# Patient Record
Sex: FEMALE | Race: WHITE | Employment: FULL TIME | URBAN - METROPOLITAN AREA
[De-identification: names, ages, dates, MRNs, and addresses within clinical notes are randomized per-mention and may not be internally consistent; named-entity substitution may affect disease eponyms.]

---

## 2019-06-12 ENCOUNTER — HOSPITAL ENCOUNTER (OUTPATIENT)
Dept: MAMMOGRAPHY | Age: 29
Discharge: HOME | End: 2019-06-12
Attending: NURSE PRACTITIONER
Payer: COMMERCIAL

## 2019-06-12 DIAGNOSIS — N63.13 BREAST LUMP ON RIGHT SIDE AT 7 O'CLOCK POSITION: ICD-10-CM

## 2019-06-12 DIAGNOSIS — N63.0 BREAST LUMP: ICD-10-CM

## 2019-06-12 PROCEDURE — 76642 ULTRASOUND BREAST LIMITED: CPT

## 2019-07-10 ENCOUNTER — HOSPITAL ENCOUNTER (OUTPATIENT)
Dept: MAMMOGRAPHY | Age: 29
Discharge: HOME | End: 2019-07-10
Attending: NURSE PRACTITIONER

## 2019-07-10 DIAGNOSIS — N63.20 BREAST MASS, LEFT: ICD-10-CM

## 2025-04-08 ENCOUNTER — HOSPITAL ENCOUNTER (OUTPATIENT)
Dept: PHYSICAL THERAPY | Age: 35
Setting detail: RECURRING SERIES
Discharge: HOME | End: 2025-04-11
Payer: COMMERCIAL

## 2025-04-08 DIAGNOSIS — M54.59 OTHER LOW BACK PAIN: Primary | ICD-10-CM

## 2025-04-08 DIAGNOSIS — M62.81 MUSCLE WEAKNESS (GENERALIZED): ICD-10-CM

## 2025-04-08 DIAGNOSIS — M25.552 PAIN IN LEFT HIP: ICD-10-CM

## 2025-04-08 PROCEDURE — 97162 PT EVAL MOD COMPLEX 30 MIN: CPT

## 2025-04-08 PROCEDURE — 97110 THERAPEUTIC EXERCISES: CPT

## 2025-04-08 NOTE — THERAPY EVALUATION
full lumbar AROM without pain or compensation to attain picking up objects from the ground     Discharge Goals: Time Frame: 8 weeks   Pt will report at least 90% improvement in pain and function to return to PLOF and pain free status    Pt will demonstrate proper hip hinging with addition of weight to mimic functional bending and lifting at home   Pt will report sleeping without pain at night   Pt will demonstrate x15 sit <-> stand without symptoms or compensation to improve LE strength and endurance          Medical Necessity:   > Skilled intervention continues to be required due to low back pain limiting sleeping and sitting .  Reason For Services/Other Comments:  > Patient continues to require skilled intervention due to low back pain limiting sleeping and sitting.      Regarding Ning Kennedy's therapy, I certify that the treatment plan above will be carried out by a therapist or under their direction.  Thank you for this referral,  EDWIGE DYER, PT     Referring Physician Signature: George Bustamante MD _______________________________ Date _____________        Charge Capture  Events  Appt Desk  Attendance Report

## 2025-04-09 ASSESSMENT — PAIN SCALES - GENERAL: PAINLEVEL_OUTOF10: 6

## 2025-04-09 NOTE — PROGRESS NOTES
Ning Kennedy  : 1990  Primary: Veena (Commercial)  Secondary:  [facility] [place] @ [place] [address] [place] [address]  Phone: [phone]  Fax: [phone] Plan Frequency: 2x for 8 weeks    Plan of Care/Certification Expiration Date: 25      >PT Visit Info:  Plan Frequency: 2x for 8 weeks  Plan of Care/Certification Expiration Date: 25  Progress Note Counter: 1      Visit Count:  1    OUTPATIENT PHYSICAL THERAPY:OP NOTE TYPE: Treatment Note 2025       Episode  }Appt Desk             Treatment Diagnosis:    Other low back pain  Pain in left hip  Muscle weakness (generalized)  Medical/Referring Diagnosis:  Acute bilateral low back pain without sciatica [M54.50]  Referring Physician:  George Bustamante MD MD Orders:  PT Eval and Treat   Date of Onset:  Onset Date: 25     Allergies:   Bupropion  Restrictions/Precautions:  No data recordedNo data recorded   Interventions Planned (Treatment may consist of any combination of the following):    No data recorded   >Subjective Comments:   Chief complaint of low back pain, see eval   >Initial:     6/10>Post Session:       5/10  Medications Last Reviewed:  2025  Updated Objective Findings:  See Evaluation Note from today  Treatment   THERAPEUTIC EXERCISE: (15 minutes):    Exercises per grid below to improve mobility, strength, and balance.  Required minimal verbal and manual cues to promote proper body alignment, promote proper body posture, and promote proper body mechanics.  Progressed resistance, range, and repetitions as indicated.  Date: 25  Supine Hip windshield wipers   Ppt x10 reps - struggles   Figure 4 seated   L hip and SI LAD mjm grade 4/5 - verbal consent   L glute stm/mfr and TLJ pa     Deferred        Treatment/Session Summary:    >Treatment Assessment:   Manju demonstrated good understanding of POC and HEP. Plan to progress hip and core mobility and stability.

## 2025-04-11 ENCOUNTER — APPOINTMENT (OUTPATIENT)
Dept: PHYSICAL THERAPY | Age: 35
End: 2025-04-11
Payer: COMMERCIAL

## 2025-04-15 ENCOUNTER — HOSPITAL ENCOUNTER (OUTPATIENT)
Dept: PHYSICAL THERAPY | Age: 35
Setting detail: RECURRING SERIES
Discharge: HOME | End: 2025-04-18
Payer: COMMERCIAL

## 2025-04-15 PROCEDURE — 97140 MANUAL THERAPY 1/> REGIONS: CPT

## 2025-04-15 PROCEDURE — 97110 THERAPEUTIC EXERCISES: CPT

## 2025-04-15 NOTE — PROGRESS NOTES
Ning Kennedy  : 1990  Primary: Veena Open Access Plus (oap) (Commercial)  Secondary:  [facility] @ [place] [address] [place] [address]  Phone: [phone]  Fax: [phone] Plan Frequency: 2x for 8 weeks    Plan of Care/Certification Expiration Date: 25      >PT Visit Info:  Plan Frequency: 2x for 8 weeks  Plan of Care/Certification Expiration Date: 25  Progress Note Counter: 2      Visit Count:  2    OUTPATIENT PHYSICAL THERAPY:OP NOTE TYPE: Treatment Note 4/15/2025       Episode  }Appt Desk             Treatment Diagnosis:    Other low back pain  Pain in left hip  Muscle weakness (generalized)  Medical/Referring Diagnosis:  Acute bilateral low back pain without sciatica [M54.50]  Referring Physician:  George Bustamante MD MD Orders:  PT Eval and Treat   Date of Onset:  Onset Date: 25     Allergies:   Bupropion  Restrictions/Precautions:  No data recordedNo data recorded   Interventions Planned (Treatment may consist of any combination of the following):    No data recorded   >Subjective Comments:  Manju states she is doing ok, sleeping is improved with pillow under knees. Practicing HEP.   >Initial:     4/10>Post Session:       2/10  Medications Last Reviewed:  4/15/2025  Updated Objective Findings:    L hip PROM: 100, 40, 8     Post tx: flexion 110, ER 50, IR 15   Treatment   MANUAL THERAPY: (15 minutes):   Joint mobilization and Soft tissue mobilization was utilized and necessary because of the patient's restricted joint motion and painful spasm.   Date: 4/15/25  L hip post and inf glide mjm grade 3/4   L glute stm/mfr   L3/4 L4/5 gapping mjm grade 3/4   Deferred          THERAPEUTIC EXERCISE: (40 minutes):    Exercises per grid below to improve mobility, strength, and balance.  Required minimal verbal and manual cues to promote proper body alignment, promote proper body posture, and promote proper body mechanics.  Progressed resistance,

## 2025-04-16 ASSESSMENT — PAIN SCALES - GENERAL: PAINLEVEL_OUTOF10: 4

## 2025-04-17 NOTE — PROGRESS NOTES
Ningedith Kennedy  : 1990  Primary: Veena Open Access Plus (oap) (Commercial)  Secondary:  [facility] @ [place] [address] [place] [address]  Phone: [phone]  Fax: [phone] Plan Frequency: 2x for 8 weeks    Plan of Care/Certification Expiration Date: 25      >PT Visit Info:  Plan Frequency: 2x for 8 weeks  Plan of Care/Certification Expiration Date: 25  Progress Note Counter: 3      Visit Count:  3    OUTPATIENT PHYSICAL THERAPY:OP NOTE TYPE: Treatment Note 2025       Episode  }Appt Desk             Treatment Diagnosis:    Other low back pain  Pain in left hip  Muscle weakness (generalized)  Medical/Referring Diagnosis:  Acute bilateral low back pain without sciatica [M54.50]  Referring Physician:  George Bustamante MD MD Orders:  PT Eval and Treat   Date of Onset:  Onset Date: 25     Allergies:   Bupropion  Restrictions/Precautions:  No data recordedNo data recorded   Interventions Planned (Treatment may consist of any combination of the following):    No data recorded   >Subjective Comments:  Manju reports that her pain has already improved significantly with 3 therapy visits.  She reports some continued discomfort in her low back though.    >Initial:     1/10>Post Session:       0/10  Medications Last Reviewed:  2025  Updated Objective Findings:    None today   Treatment   MANUAL THERAPY: (15 minutes):   Joint mobilization and Soft tissue mobilization was utilized and necessary because of the patient's restricted joint motion and painful spasm.   Date: 4/15/25  L hip post and inf glide mjm grade 3/4   L glute stm/mfr   L3/4 L4/5 gapping mjm grade 3/4   STM to lumbar erector spinae   Deferred          THERAPEUTIC EXERCISE: (40 minutes):    Exercises per grid below to improve mobility, strength, and balance.  Required minimal verbal and manual cues to promote proper body alignment, promote proper body posture, and promote proper body

## 2025-04-18 ENCOUNTER — HOSPITAL ENCOUNTER (OUTPATIENT)
Dept: PHYSICAL THERAPY | Age: 35
Setting detail: RECURRING SERIES
Discharge: HOME | End: 2025-04-21
Payer: COMMERCIAL

## 2025-04-18 PROCEDURE — 97140 MANUAL THERAPY 1/> REGIONS: CPT

## 2025-04-18 PROCEDURE — 97110 THERAPEUTIC EXERCISES: CPT

## 2025-04-18 ASSESSMENT — PAIN SCALES - GENERAL: PAINLEVEL_OUTOF10: 1

## 2025-04-22 ENCOUNTER — HOSPITAL ENCOUNTER (OUTPATIENT)
Dept: PHYSICAL THERAPY | Age: 35
Setting detail: RECURRING SERIES
Discharge: HOME | End: 2025-04-25
Payer: COMMERCIAL

## 2025-04-22 PROCEDURE — 97110 THERAPEUTIC EXERCISES: CPT

## 2025-04-22 PROCEDURE — 97140 MANUAL THERAPY 1/> REGIONS: CPT

## 2025-04-22 ASSESSMENT — PAIN SCALES - GENERAL: PAINLEVEL_OUTOF10: 1

## 2025-04-22 NOTE — PROGRESS NOTES
Ning Kennedy  : 1990  Primary: Veena Open Access Plus (oap) (Commercial)  Secondary:  [facility] @ [place] [address] [place] [address]  Phone: [phone]  Fax: [phone] Plan Frequency: 2x for 8 weeks    Plan of Care/Certification Expiration Date: 25      >PT Visit Info:  Plan Frequency: 2x for 8 weeks  Plan of Care/Certification Expiration Date: 25  Progress Note Counter: 4      Visit Count:  4    OUTPATIENT PHYSICAL THERAPY:OP NOTE TYPE: Treatment Note 2025       Episode  }Appt Desk             Treatment Diagnosis:    Other low back pain  Pain in left hip  Muscle weakness (generalized)  Medical/Referring Diagnosis:  Acute bilateral low back pain without sciatica [M54.50]  Referring Physician:  George Bustamante MD MD Orders:  PT Eval and Treat   Date of Onset:  Onset Date: 25     Allergies:   Bupropion  Restrictions/Precautions:  No data recordedNo data recorded   Interventions Planned (Treatment may consist of any combination of the following):    No data recorded   >Subjective Comments:  Manju states she is continuing to feel really good, minimal numbness and tingling in the leg.     >Initial:     1/10>Post Session:       0/10  Medications Last Reviewed:  2025  Updated Objective Findings:    None today   Treatment   MANUAL THERAPY: (15 minutes):   Joint mobilization and Soft tissue mobilization was utilized and necessary because of the patient's restricted joint motion and painful spasm.   Date: 25  L hip post and inf glide mjm grade 3/4   L glute stm/mfr   STM to lumbar erector spinae   Deferred    L3/4 L4/5 gapping mjm grade 3/4       THERAPEUTIC EXERCISE: (40 minutes):    Exercises per grid below to improve mobility, strength, and balance.  Required minimal verbal and manual cues to promote proper body alignment, promote proper body posture, and promote proper body mechanics.  Progressed resistance, range, and

## 2025-04-25 ENCOUNTER — APPOINTMENT (OUTPATIENT)
Dept: PHYSICAL THERAPY | Age: 35
End: 2025-04-25
Payer: COMMERCIAL

## 2025-05-08 ENCOUNTER — HOSPITAL ENCOUNTER (OUTPATIENT)
Dept: PHYSICAL THERAPY | Age: 35
Setting detail: RECURRING SERIES
End: 2025-05-08
Payer: COMMERCIAL

## 2025-05-12 ENCOUNTER — HOSPITAL ENCOUNTER (OUTPATIENT)
Dept: PHYSICAL THERAPY | Age: 35
Setting detail: RECURRING SERIES
End: 2025-05-12
Payer: COMMERCIAL

## 2025-05-19 ENCOUNTER — HOSPITAL ENCOUNTER (OUTPATIENT)
Dept: PHYSICAL THERAPY | Age: 35
Setting detail: RECURRING SERIES
Discharge: HOME | End: 2025-05-22
Payer: COMMERCIAL

## 2025-05-19 PROCEDURE — 97140 MANUAL THERAPY 1/> REGIONS: CPT

## 2025-05-19 PROCEDURE — 97110 THERAPEUTIC EXERCISES: CPT

## 2025-05-19 ASSESSMENT — PAIN SCALES - GENERAL: PAINLEVEL_OUTOF10: 4

## 2025-05-19 NOTE — PROGRESS NOTES
Ningedith Kennedy  : 1990  Primary: Veena Open Access Plus (oap) (Commercial)  Secondary:  [facility] @ [place] [address] [place] [address]  Phone: [phone]  Fax: [phone] Plan Frequency: 2x for 8 weeks    Plan of Care/Certification Expiration Date: 25      >PT Visit Info:  Plan Frequency: 2x for 8 weeks  Plan of Care/Certification Expiration Date: 25  Progress Note Counter: 4      Visit Count:  5    OUTPATIENT PHYSICAL THERAPY:OP NOTE TYPE: Treatment Note 2025       Episode  }Appt Desk             Treatment Diagnosis:    Other low back pain  Pain in left hip  Muscle weakness (generalized)  Medical/Referring Diagnosis:  Acute bilateral low back pain without sciatica [M54.50]  Referring Physician:  George Bustamante MD MD Orders:  PT Eval and Treat   Date of Onset:  Onset Date: 25     Allergies:   Bupropion  Restrictions/Precautions:  No data recordedNo data recorded   Interventions Planned (Treatment may consist of any combination of the following):    No data recorded   >Subjective Comments:  Manju states that she was leaning forward to  a breakfast plate and felt a \"slip\" in her back.  Was able to do some exercises on the floor but then had a lot of trouble getting up.  For the next 4 days she really struggled with mobility.  Is feeling better today but it is still very tender.    >Initial:     4/10>Post Session:       4/10  Medications Last Reviewed:  2025  Updated Objective Findings:    None today   Treatment   MANUAL THERAPY: (15 minutes):   Joint mobilization and Soft tissue mobilization was utilized and necessary because of the patient's restricted joint motion and painful spasm.   Date: 25  L hip post and inf glide mjm grade 3/4   L glute stm/mfr   STM to lumbar erector spinae   Long axis distraction R LE only  Deferred    L3/4 L4/5 gapping mjm grade 3/4       THERAPEUTIC EXERCISE: (40 minutes):    Exercises per

## 2025-05-29 ENCOUNTER — HOSPITAL ENCOUNTER (OUTPATIENT)
Dept: PHYSICAL THERAPY | Age: 35
Setting detail: RECURRING SERIES
End: 2025-05-29
Payer: COMMERCIAL

## 2025-06-05 ENCOUNTER — HOSPITAL ENCOUNTER (OUTPATIENT)
Dept: PHYSICAL THERAPY | Age: 35
Setting detail: RECURRING SERIES
Discharge: HOME | End: 2025-06-08
Payer: COMMERCIAL

## 2025-06-05 PROCEDURE — 97110 THERAPEUTIC EXERCISES: CPT

## 2025-06-05 PROCEDURE — 97140 MANUAL THERAPY 1/> REGIONS: CPT

## 2025-06-05 ASSESSMENT — PAIN SCALES - GENERAL: PAINLEVEL_OUTOF10: 3

## 2025-06-05 NOTE — PROGRESS NOTES
Ning Kennedy  : 1990  Primary: Veena Open Access Plus (oap) (Commercial)  Secondary:  [facility] @ [place] [address] [place] [address]  Phone: [phone]  Fax: [phone] Plan Frequency: 2x for 8 weeks    Plan of Care/Certification Expiration Date: 25      >PT Visit Info:  Plan Frequency: 2x for 8 weeks  Plan of Care/Certification Expiration Date: 25  Progress Note Counter: 4      Visit Count:  6    OUTPATIENT PHYSICAL THERAPY:OP NOTE TYPE: Treatment Note 2025       Episode  }Appt Desk             Treatment Diagnosis:    Other low back pain  Pain in left hip  Muscle weakness (generalized)  Medical/Referring Diagnosis:  Acute bilateral low back pain without sciatica [M54.50]  Referring Physician:  George Bustamante MD MD Orders:  PT Eval and Treat   Date of Onset:  Onset Date: 25     Allergies:   Bupropion  Restrictions/Precautions:  No data recordedNo data recorded   Interventions Planned (Treatment may consist of any combination of the following):    No data recorded   >Subjective Comments:  Manju states that her pain has gotten back to normal but she is avoiding all bending.    >Initial:     3/10>Post Session:       2/10  Medications Last Reviewed:  2025  Updated Objective Findings:    None today   Treatment   MANUAL THERAPY: (15 minutes):   Joint mobilization and Soft tissue mobilization was utilized and necessary because of the patient's restricted joint motion and painful spasm.   Date: 25  L hip post and inf glide mjm grade 3/4   L glute stm/mfr   STM to lumbar erector spinae   Long axis distraction R LE only  Deferred    L3/4 L4/5 gapping mjm grade 3/4       THERAPEUTIC EXERCISE: (40 minutes):    Exercises per grid below to improve mobility, strength, and balance.  Required minimal verbal and manual cues to promote proper body alignment, promote proper body posture, and promote proper body mechanics.  Progressed

## 2025-06-16 ENCOUNTER — HOSPITAL ENCOUNTER (OUTPATIENT)
Dept: PHYSICAL THERAPY | Age: 35
Setting detail: RECURRING SERIES
End: 2025-06-16
Payer: COMMERCIAL

## 2025-06-19 ENCOUNTER — HOSPITAL ENCOUNTER (OUTPATIENT)
Dept: PHYSICAL THERAPY | Age: 35
Setting detail: RECURRING SERIES
Discharge: HOME | End: 2025-06-22
Payer: COMMERCIAL

## 2025-06-19 PROCEDURE — 97140 MANUAL THERAPY 1/> REGIONS: CPT

## 2025-06-19 PROCEDURE — 97110 THERAPEUTIC EXERCISES: CPT

## 2025-06-19 ASSESSMENT — PAIN SCALES - GENERAL: PAINLEVEL_OUTOF10: 2

## 2025-06-19 NOTE — PROGRESS NOTES
Ning Kennedy  : 1990  Primary: Veena Open Access Plus (oap) (Commercial)  Secondary:  [facility] [place] @ [place] [address] [place] [place] [address]  Phone: [phone]  Fax: [phone] Plan Frequency: 2x for 8 weeks    Plan of Care/Certification Expiration Date: 25      >PT Visit Info:  Plan Frequency: 2x for 8 weeks  Plan of Care/Certification Expiration Date: 25  Progress Note Counter: 7      Visit Count:  7    OUTPATIENT PHYSICAL THERAPY:OP NOTE TYPE: Treatment Note 2025       Episode  }Appt Desk             Treatment Diagnosis:    Other low back pain  Pain in left hip  Muscle weakness (generalized)  Medical/Referring Diagnosis:  Acute bilateral low back pain without sciatica [M54.50]  Referring Physician:  George Bustamante MD MD Orders:  PT Eval and Treat   Date of Onset:  Onset Date: 25     Allergies:   Bupropion  Restrictions/Precautions:  No data recordedNo data recorded   Interventions Planned (Treatment may consist of any combination of the following):    No data recorded   >Subjective Comments:  Manju states that she is starting to try to do more stretching and is feeling better recently.    >Initial:     2/10>Post Session:       2/10  Medications Last Reviewed:  2025  Updated Objective Findings:    None today   Treatment   MANUAL THERAPY: (15 minutes):   Joint mobilization and Soft tissue mobilization was utilized and necessary because of the patient's restricted joint motion and painful spasm.   Date: 25  L hip post and inf glide mjm grade 3/4   L glute stm/mfr   STM to lumbar erector spinae     Deferred    L3/4 L4/5 gapping mjm grade 3/4   Long axis distraction R LE only     THERAPEUTIC EXERCISE: (40 minutes):    Exercises per grid below to improve mobility, strength, and balance.  Required minimal verbal and manual cues to promote proper body alignment, promote proper body posture, and promote proper body mechanics.

## 2025-06-23 ENCOUNTER — HOSPITAL ENCOUNTER (OUTPATIENT)
Dept: PHYSICAL THERAPY | Age: 35
Setting detail: RECURRING SERIES
Discharge: HOME | End: 2025-06-26
Payer: COMMERCIAL

## 2025-06-23 PROCEDURE — 97140 MANUAL THERAPY 1/> REGIONS: CPT

## 2025-06-23 PROCEDURE — 97110 THERAPEUTIC EXERCISES: CPT

## 2025-06-23 ASSESSMENT — PAIN SCALES - GENERAL: PAINLEVEL_OUTOF10: 2

## 2025-06-23 NOTE — PROGRESS NOTES
Ning Kennedy  : 1990  Primary: Veena Open Access Plus (oap) (Commercial)  Secondary:  [facility] @ [place] [address] [place] [place] [address]  Phone: [phone]  Fax: [phone] Plan Frequency: 2x for 8 weeks    Plan of Care/Certification Expiration Date: 25      >PT Visit Info:  Plan Frequency: 2x for 8 weeks  Plan of Care/Certification Expiration Date: 25  Progress Note Counter: 8      Visit Count:  8    OUTPATIENT PHYSICAL THERAPY:OP NOTE TYPE: Treatment Note 2025       Episode  }Appt Desk             Treatment Diagnosis:    Other low back pain  Pain in left hip  Muscle weakness (generalized)  Medical/Referring Diagnosis:  Acute bilateral low back pain without sciatica [M54.50]  Referring Physician:  George Bustamante MD MD Orders:  PT Eval and Treat   Date of Onset:  Onset Date: 25     Allergies:   Bupropion  Restrictions/Precautions:  No data recordedNo data recorded   Interventions Planned (Treatment may consist of any combination of the following):    No data recorded   >Subjective Comments:  Manju states that she was able to bend forward with almost no pain this week.    >Initial:     2/10>Post Session:       1/10  Medications Last Reviewed:  2025  Updated Objective Findings:    None today   Treatment   MANUAL THERAPY: (15 minutes):   Joint mobilization and Soft tissue mobilization was utilized and necessary because of the patient's restricted joint motion and painful spasm.   Date: 25    L glute stm/mfr   STM to lumbar erector spinae   Long axis distraction R LE only   Deferred    L3/4 L4/5 gapping mjm grade 3/4   L hip post and inf glide mjm grade 3/4     THERAPEUTIC EXERCISE: (18 minutes):    Exercises per grid below to improve mobility, strength, and balance.  Required minimal verbal and manual cues to promote proper body alignment, promote proper body posture, and promote proper body mechanics.  Progressed resistance,

## 2025-06-26 ENCOUNTER — HOSPITAL ENCOUNTER (OUTPATIENT)
Dept: PHYSICAL THERAPY | Age: 35
Setting detail: RECURRING SERIES
End: 2025-06-26
Payer: COMMERCIAL

## 2025-06-30 ENCOUNTER — HOSPITAL ENCOUNTER (OUTPATIENT)
Dept: PHYSICAL THERAPY | Age: 35
Setting detail: RECURRING SERIES
Discharge: HOME | End: 2025-07-03
Payer: COMMERCIAL

## 2025-06-30 PROCEDURE — 97110 THERAPEUTIC EXERCISES: CPT

## 2025-06-30 PROCEDURE — 97140 MANUAL THERAPY 1/> REGIONS: CPT

## 2025-06-30 ASSESSMENT — PAIN SCALES - GENERAL: PAINLEVEL_OUTOF10: 0

## 2025-06-30 NOTE — PROGRESS NOTES
Ning Kennedy  : 1990  Primary: Veena Open Access Plus (oap) (Commercial)  Secondary:  [facility] @ [place] [address] [place] [place] [address]  Phone: [phone]  Fax: [phone] Plan Frequency: 2x for 8 weeks    Plan of Care/Certification Expiration Date: 25      >PT Visit Info:  Plan Frequency: 2x for 8 weeks  Plan of Care/Certification Expiration Date: 25  Progress Note Counter: 9      Visit Count:  9    OUTPATIENT PHYSICAL THERAPY:OP NOTE TYPE: Treatment Note 2025       Episode  }Appt Desk             Treatment Diagnosis:    Other low back pain  Pain in left hip  Muscle weakness (generalized)  Medical/Referring Diagnosis:  Acute bilateral low back pain without sciatica [M54.50]  Referring Physician:  George Bustamante MD MD Orders:  PT Eval and Treat   Date of Onset:  Onset Date: 25     Allergies:   Bupropion  Restrictions/Precautions:  No data recordedNo data recorded   Interventions Planned (Treatment may consist of any combination of the following):    No data recorded   >Subjective Comments:  Manju states that she has not had pain in about 2 weeks    >Initial:     0/10>Post Session:       0/10  Medications Last Reviewed:  2025  Updated Objective Findings:    See discharge assessment  Treatment   MANUAL THERAPY: (10 minutes):   Joint mobilization and Soft tissue mobilization was utilized and necessary because of the patient's restricted joint motion and painful spasm.   Date: 25    L glute stm/mfr   STM to lumbar erector spinae   Long axis distraction R LE only   Deferred    L3/4 L4/5 gapping mjm grade 3/4   L hip post and inf glide mjm grade 3/4     THERAPEUTIC EXERCISE: (45 minutes):    Exercises per grid below to improve mobility, strength, and balance.  Required minimal verbal and manual cues to promote proper body alignment, promote proper body posture, and promote proper body mechanics.  Progressed resistance, range, and

## 2025-06-30 NOTE — THERAPY DISCHARGE
Ningleslie Kennedy  : 1990  Primary: Veena Open Access Plus (oap) (Commercial)  Secondary:  [facility] @ [place]  [address] [place] [place] [address]  Phone: [phone]  Fax: [phone] Plan Frequency: 2x for 8 weeks    Plan of Care/Certification Expiration Date: 25        Plan of Care/Certification Expiration Date:  Plan of Care/Certification Expiration Date: 25    Frequency/Duration: Plan Frequency: 2x for 8 weeks      Time In/Out:   Time In: 1530  Time Out: 1630      PT Visit Info:    Progress Note Counter: 9      Visit Count:  9                OUTPATIENT PHYSICAL THERAPY:             Discharge Summary 2025               Episode (Low Back Pain)         Treatment Diagnosis:     Other low back pain  Pain in left hip  Muscle weakness (generalized)  Medical/Referring Diagnosis:    Acute bilateral low back pain without sciatica    Referring Physician:  George Bustamante MD MD Orders:  PT Eval and Treat   Return MD Appt:    Date of Onset:  Onset Date: 25     Allergies:  Bupropion  Restrictions/Precautions:    None      Medications Last Reviewed: 2025     SUBJECTIVE   History of Injury/Illness (Reason for Referral):  Ning, \"Manju\", presents with a chief complaint of low back pain across the L3 region that radiates down the L lateral leg to the knee. Symptoms began 9 years ago when caring for her young son and lasted a few days. Since she has not had any more issues until she was lifting a grill out of a truck and had immediate pain in the same place. These symptoms are improved, but still painful. Aggravating factors including difficulty sleeping especially laying supine, increased tightness upon waking for 20 minutes each day, and sitting. Pt works out at the gym regularly with running, walking, and weight training and has no issues in her back then. Pt denied red flags, no B&B dysfunction, hx of vaginal birth 10 yr ago.     Patient Stated

## 2025-07-14 ENCOUNTER — HOSPITAL ENCOUNTER (EMERGENCY)
Age: 35
Discharge: HOME | End: 2025-07-14
Attending: STUDENT IN AN ORGANIZED HEALTH CARE EDUCATION/TRAINING PROGRAM
Payer: COMMERCIAL

## 2025-07-14 ENCOUNTER — APPOINTMENT (OUTPATIENT)
Dept: GENERAL RADIOLOGY | Age: 35
End: 2025-07-14
Payer: COMMERCIAL

## 2025-07-14 VITALS
DIASTOLIC BLOOD PRESSURE: 60 MMHG | HEART RATE: 69 BPM | RESPIRATION RATE: 23 BRPM | OXYGEN SATURATION: 99 % | BODY MASS INDEX: 47.82 KG/M2 | WEIGHT: 287 LBS | TEMPERATURE: 97.9 F | SYSTOLIC BLOOD PRESSURE: 118 MMHG | HEIGHT: 65 IN

## 2025-07-14 DIAGNOSIS — T14.8XXA MUSCLE STRAIN: ICD-10-CM

## 2025-07-14 DIAGNOSIS — R07.89 CHEST WALL PAIN: Primary | ICD-10-CM

## 2025-07-14 LAB
ALBUMIN SERPL-MCNC: 3.9 G/DL (ref 3.5–5)
ALBUMIN/GLOB SERPL: 1 (ref 1–1.9)
ALP SERPL-CCNC: 116 U/L (ref 35–104)
ALT SERPL-CCNC: 25 U/L (ref 12–65)
ANION GAP SERPL CALC-SCNC: 12 MMOL/L (ref 7–16)
AST SERPL-CCNC: 23 U/L (ref 15–37)
BASOPHILS # BLD: 0.04 K/UL (ref 0–0.2)
BASOPHILS NFR BLD: 0.4 % (ref 0–2)
BILIRUB SERPL-MCNC: 0.3 MG/DL (ref 0–1.2)
BUN SERPL-MCNC: 13 MG/DL (ref 6–23)
CALCIUM SERPL-MCNC: 9 MG/DL (ref 8.8–10.2)
CHLORIDE SERPL-SCNC: 103 MMOL/L (ref 98–107)
CO2 SERPL-SCNC: 23 MMOL/L (ref 20–29)
CREAT SERPL-MCNC: 0.61 MG/DL (ref 0.8–1.3)
DIFFERENTIAL METHOD BLD: ABNORMAL
EOSINOPHIL # BLD: 0.15 K/UL (ref 0–0.8)
EOSINOPHIL NFR BLD: 1.6 % (ref 0.5–7.8)
ERYTHROCYTE [DISTWIDTH] IN BLOOD BY AUTOMATED COUNT: 13.2 % (ref 11.9–14.6)
GLOBULIN SER CALC-MCNC: 3.8 G/DL (ref 2.3–3.5)
GLUCOSE SERPL-MCNC: 91 MG/DL (ref 65–100)
HCT VFR BLD AUTO: 40 % (ref 35.8–46.3)
HGB BLD-MCNC: 13.2 G/DL (ref 11.7–15.4)
IMM GRANULOCYTES # BLD AUTO: 0.02 K/UL (ref 0–0.5)
IMM GRANULOCYTES NFR BLD AUTO: 0.2 % (ref 0–5)
LYMPHOCYTES # BLD: 3.39 K/UL (ref 0.5–4.6)
LYMPHOCYTES NFR BLD: 36.5 % (ref 13–44)
MCH RBC QN AUTO: 26 PG (ref 26.1–32.9)
MCHC RBC AUTO-ENTMCNC: 33 G/DL (ref 31.4–35)
MCV RBC AUTO: 78.9 FL (ref 82–102)
MONOCYTES # BLD: 0.39 K/UL (ref 0.1–1.3)
MONOCYTES NFR BLD: 4.2 % (ref 4–12)
NEUTS SEG # BLD: 5.3 K/UL (ref 1.7–8.2)
NEUTS SEG NFR BLD: 57.1 % (ref 43–78)
NRBC # BLD: 0 K/UL (ref 0–0.2)
PLATELET # BLD AUTO: 301 K/UL (ref 150–450)
PMV BLD AUTO: 10.3 FL (ref 9.4–12.3)
POTASSIUM SERPL-SCNC: 3.9 MMOL/L (ref 3.5–5.1)
PROT SERPL-MCNC: 7.7 G/DL (ref 6.3–8.2)
RBC # BLD AUTO: 5.07 M/UL (ref 4.05–5.2)
SODIUM SERPL-SCNC: 138 MMOL/L (ref 133–143)
TROPONIN T SERPL HS-MCNC: <6 NG/L (ref 0–14)
TROPONIN T SERPL HS-MCNC: <6 NG/L (ref 0–14)
WBC # BLD AUTO: 9.3 K/UL (ref 4.3–11.1)

## 2025-07-14 PROCEDURE — 80053 COMPREHEN METABOLIC PANEL: CPT

## 2025-07-14 PROCEDURE — 84484 ASSAY OF TROPONIN QUANT: CPT

## 2025-07-14 PROCEDURE — 71046 X-RAY EXAM CHEST 2 VIEWS: CPT

## 2025-07-14 PROCEDURE — 85025 COMPLETE CBC W/AUTO DIFF WBC: CPT

## 2025-07-14 PROCEDURE — 99285 EMERGENCY DEPT VISIT HI MDM: CPT

## 2025-07-14 ASSESSMENT — PAIN DESCRIPTION - LOCATION: LOCATION: CHEST;ARM

## 2025-07-14 ASSESSMENT — PAIN DESCRIPTION - DESCRIPTORS: DESCRIPTORS: BURNING

## 2025-07-14 ASSESSMENT — PAIN DESCRIPTION - PAIN TYPE: TYPE: ACUTE PAIN

## 2025-07-14 ASSESSMENT — PAIN - FUNCTIONAL ASSESSMENT: PAIN_FUNCTIONAL_ASSESSMENT: 0-10

## 2025-07-14 ASSESSMENT — PAIN DESCRIPTION - ORIENTATION: ORIENTATION: LEFT

## 2025-07-14 NOTE — DISCHARGE INSTRUCTIONS
Your blood work, EKG and chest x-ray all appear stable today.  Perform the exercises listed.  Take Tylenol Motrin as needed for discomfort.  Arrange follow-up with your primary care provider for recheck.  Return for worsening symptoms, concerns or questions.

## 2025-07-14 NOTE — ED PROVIDER NOTES
Emergency Department Provider Note       [facility]PT   PCP: George Bustamante MD   Age: 35 y.o.   Sex: female     DISPOSITION Discharge - Pending Orders Complete 07/14/2025 10:15:43 AM    ICD-10-CM    1. Chest wall pain  R07.89       2. Muscle strain  T14.8XXA           Medical Decision Making     35-year-old female patient presenting this department with reports of pain in the left upper chest wall, and left neck and to the left shoulder.  On my initial assessment, my suspicion is that the symptoms are most likely musculoskeletal in nature.  Patient voices significant concern that this may be related to her heart.  Provided reassurance on my assessment, will obtain basic blood work to include cardiac markers, chest x-ray.  EKG on arrival is reassuring.  Patient has no identified risk factors for heart disease.  ED Course as of 07/14/25 1016   Mon Jul 14, 2025   0836 EKG interpretation: Sinus rhythm, rate of 72, normal axis, no obvious ischemic change [BR]   0940 Thus far patient's workup is unremarkable including initial troponin.  Vital signs are stable, patient well-appearing. [BR]   0941 Chest x-ray reviewed by me shows no evidence of acute abnormality [BR]      ED Course User Index  [BR] Carter Craig R, DO     1 or more acute illnesses that pose a threat to life or bodily function.   Over the counter drug management performed.  Patient was discharged risks and benefits of hospitalization were considered.  Shared medical decision making was utilized in creating the patients health plan today.  I independently ordered and reviewed each unique test.    I reviewed external records: ED visit note from a different ED.   I reviewed external records: provider visit note from PCP.  I reviewed external records: provider visit note from outside specialist.     ED cardiac monitoring rhythm strip was ordered and interpreted:  sinus rhythm, no evidence of an arrhythmia  ST Segments:Normal ST segments -

## 2025-07-14 NOTE — ED TRIAGE NOTES
Pt ambulatory to triage for reports of chest pain & L arm heaviness. Pt states she woke up this morning & started to sit down & work when she felt L sided chest pain. Pt describes pain as a burning sensation moving down L arm. Pt also reports a \"heaviness\" feeling. Pt reports she is sore from workout she did on Saturday but got concerned.